# Patient Record
Sex: FEMALE | Race: WHITE | NOT HISPANIC OR LATINO | Employment: UNEMPLOYED | URBAN - METROPOLITAN AREA
[De-identification: names, ages, dates, MRNs, and addresses within clinical notes are randomized per-mention and may not be internally consistent; named-entity substitution may affect disease eponyms.]

---

## 2023-03-31 ENCOUNTER — OFFICE VISIT (OUTPATIENT)
Dept: GASTROENTEROLOGY | Facility: MEDICAL CENTER | Age: 58
End: 2023-03-31

## 2023-03-31 VITALS
SYSTOLIC BLOOD PRESSURE: 144 MMHG | BODY MASS INDEX: 25.92 KG/M2 | DIASTOLIC BLOOD PRESSURE: 85 MMHG | TEMPERATURE: 98.1 F | HEART RATE: 80 BPM | WEIGHT: 151.8 LBS | HEIGHT: 64 IN

## 2023-03-31 DIAGNOSIS — K57.90 DIVERTICULOSIS: ICD-10-CM

## 2023-03-31 DIAGNOSIS — K21.9 GASTROESOPHAGEAL REFLUX DISEASE, UNSPECIFIED WHETHER ESOPHAGITIS PRESENT: ICD-10-CM

## 2023-03-31 DIAGNOSIS — K31.A0 GASTRIC INTESTINAL METAPLASIA: ICD-10-CM

## 2023-03-31 DIAGNOSIS — K59.00 CONSTIPATION, UNSPECIFIED CONSTIPATION TYPE: Primary | ICD-10-CM

## 2023-03-31 PROBLEM — I10 PRIMARY HYPERTENSION: Status: ACTIVE | Noted: 2023-03-31

## 2023-03-31 RX ORDER — PANTOPRAZOLE SODIUM 40 MG/1
TABLET, DELAYED RELEASE ORAL
COMMUNITY
Start: 2023-01-06

## 2023-03-31 RX ORDER — POLYETHYLENE GLYCOL 3350 17 G/17G
17 POWDER, FOR SOLUTION ORAL DAILY
Qty: 255 G | Refills: 0 | Status: SHIPPED | OUTPATIENT
Start: 2023-03-31

## 2023-03-31 RX ORDER — HYDROCHLOROTHIAZIDE 25 MG/1
TABLET ORAL
COMMUNITY
Start: 2023-03-26

## 2023-03-31 RX ORDER — LOSARTAN POTASSIUM 50 MG/1
TABLET ORAL
COMMUNITY
Start: 2023-02-06

## 2023-03-31 NOTE — PATIENT INSTRUCTIONS
High Fiber Diet   AMBULATORY CARE:   A high-fiber diet  includes foods that have a high amount of fiber  Fiber is the part of fruits, vegetables, and grains that is not broken down by your body  Fiber keeps your bowel movements regular  Fiber can also help lower your cholesterol level, control blood sugar in people with diabetes, and relieve constipation  Fiber can also help you control your weight because it helps you feel full faster  Most adults should eat 25 to 35 grams of fiber each day  Talk to your dietitian or healthcare provider about the amount of fiber you need  Good sources of fiber:       Foods with at least 4 grams of fiber per serving:      ? to ½ cup of high-fiber cereal (check the nutrition label on the box)    ½ cup of blackberries or raspberries    4 dried prunes    1 cooked artichoke    ½ cup of cooked legumes, such as lentils, or red, kidney, and asher beans    Foods with 1 to 3 grams of fiber per servin slice of whole-wheat, pumpernickel, or rye bread    ½ cup of cooked brown rice    4 whole-wheat crackers    1 cup of oatmeal    ½ cup of cereal with 1 to 3 grams of fiber per serving (check the nutrition label on the box)    1 small piece of fruit, such as an apple, banana, pear, kiwi, or orange    3 dates    ½ cup of canned apricots, fruit cocktail, peaches, or pears    ½ cup of raw or cooked vegetables, such as carrots, cauliflower, cabbage, spinach, squash, or corn  Ways that you can increase fiber in your diet:   Choose brown or wild rice instead of white rice  Use whole wheat flour in recipes instead of white or all-purpose flour  Add beans and peas to casseroles or soups  Choose fresh fruit and vegetables with peels or skins on instead of juices  Other diet guidelines to follow: Add fiber to your diet slowly  You may have abdominal discomfort, bloating, and gas if you add fiber to your diet too quickly  Drink plenty of liquids as you add fiber to your diet  You may have nausea or develop constipation if you do not drink enough water  Ask how much liquid to drink each day and which liquids are best for you  © Copyright [employer] 2022 Information is for End User's use only and may not be sold, redistributed or otherwise used for commercial purposes  The above information is an  only  It is not intended as medical advice for individual conditions or treatments  Talk to your doctor, nurse or pharmacist before following any medical regimen to see if it is safe and effective for you  Diverticulosis   WHAT YOU NEED TO KNOW:   Diverticulosis is a condition that causes small pockets called diverticula to form in your intestine  These pockets make it difficult for bowel movements to pass through your digestive system  DISCHARGE INSTRUCTIONS:   Return to the emergency department if:   You have severe pain on the left side of your lower abdomen  Your bowel movements are bright or dark red  Call your doctor if:   You have a fever and chills  You feel dizzy or lightheaded  You have nausea, or you are vomiting  You have a change in your bowel movements  You have questions or concerns about your condition or care  Medicines:   Medicines  to soften your bowel movements may be given  You may also need medicines to treat symptoms such as bloating and pain  Take your medicine as directed  Contact your healthcare provider if you think your medicine is not helping or if you have side effects  Tell your provider if you are allergic to any medicine  Keep a list of the medicines, vitamins, and herbs you take  Include the amounts, and when and why you take them  Bring the list or the pill bottles to follow-up visits  Carry your medicine list with you in case of an emergency  Self-care: The goal of treatment is to manage any symptoms you have and prevent other problems such as diverticulitis   Diverticulitis is swelling or infection of the diverticula  Your healthcare provider may recommend any of the following:  Eat a variety of high-fiber foods  High-fiber foods help you have regular bowel movements  High-fiber foods include cooked beans, fruits, vegetables, and some cereals  Most adults need 25 to 35 grams of fiber each day  Your healthcare provider may recommend that you have more  Ask your healthcare provider how much fiber you need  Increase fiber slowly  You may have abdominal discomfort, bloating, and gas if you add fiber to your diet too quickly  You may need to take a fiber supplement if you are not getting enough fiber from food  Drink liquids as directed  You may need to drink 2 to 3 liters (8 to 12 cups) of liquids every day  Ask your healthcare provider how much liquid to drink each day and which liquids are best for you  Apply heat  on your abdomen for 20 to 30 minutes every 2 hours for as many days as directed  Heat helps decrease pain and muscle spasms  Help prevent diverticulitis or other symptoms: The following may help decrease your risk for diverticulitis or symptoms, such as bleeding  Talk to your provider about these or other things you can do to prevent problems that may occur with diverticulosis  Exercise regularly  Ask your healthcare provider about the best exercise plan for you  Exercise can help you have regular bowel movements  Get 30 minutes of exercise on most days of the week  Maintain a healthy weight  Ask your healthcare provider what a healthy weight is for you  Ask him or her to help you create a weight loss plan if you are overweight  Do not smoke  Nicotine and other chemicals in cigarettes increase your risk for diverticulitis  Ask your healthcare provider for information if you currently smoke and need help to quit  E-cigarettes or smokeless tobacco still contain nicotine  Talk to your healthcare provider before you use these products      Ask your healthcare provider if it is safe to take NSAIDs  NSAIDs may increase your risk of diverticulitis  Follow up with your doctor as directed:  Write down your questions so you remember to ask them during your visits  © Copyright [employer] 2022 Information is for End User's use only and may not be sold, redistributed or otherwise used for commercial purposes  The above information is an  only  It is not intended as medical advice for individual conditions or treatments  Talk to your doctor, nurse or pharmacist before following any medical regimen to see if it is safe and effective for you

## 2023-03-31 NOTE — PROGRESS NOTES
[facility] - Outpatient Consultation  Tasha Estrella 63 y o  female MRN: [id number]  Encounter: [id number]          ASSESSMENT AND PLAN:  76-year-old female with history of hypertension, GERD who presents for evaluation  1  Constipation, unspecified constipation type  2  Diverticulosis  She has history of chronic constipation, bloating and generalized abdominal cramping  She underwent a CT scan in March of this year showing diverticulosis without diverticulitis  I discussed the etiology and natural history of diverticular disease with her today  We discussed bowel regimen including high-fiber diet, fiber supplementation with goal of 25 g/day and adequate hydration  I recommend starting MiraLAX once daily and increasing or decreasing as needed to have a soft, formed bowel movement per day  If she has no improvement on the MiraLAX consider transition to Linzess or Amitiza    - polyethylene glycol (GLYCOLAX) 17 GM/SCOOP powder; Take 17 g by mouth daily  Dispense: 255 g; Refill: 0        3  Gastric intestinal metaplasia  4  Gastroesophageal reflux disease, unspecified whether esophagitis present  She was found to have focal gastric intestinal metaplasia on August 2022 EGD performed at an outside hospital   We discussed that this can sometimes be a precancerous change to the lining of the stomach  I recommend repeating EGD 1 year from her last endoscopy to obtain biopsies     - EGD; Future      Follow-up in 3 months  ______________________________________________________________________    HPI: 76-year-old female with history of hypertension, GERD who presents for evaluation  She reports chronic history of infrequent bowel movements for some time  She usually has a bowel movement twice weekly  She will sometimes use a over-the-counter laxative for relief  She reports not drinking sufficient water and does not eat high-fiber diet    She has tried Metamucil in the past but stopped the fiber supplement because it was not working  She reports spasming throughout her abdomen, bloating which will be relieved after a bowel movement  She has a history of chest discomfort and reflux for which she is taking daily PPI with good relief of her symptoms  She denies rectal bleeding  She does have a sense of incomplete evacuation at times  She has no family history of colon cancer  She takes no antiplatelet or anticoagulant medications  She has no prior GI surgical history      March 2023 CT scan showed diverticulosis without diverticulitis    November 2022 esophagram showed minimal GERD  2022 colonoscopy showed 2 cecal tubular adenomas and gastric biopsy showed focal intestinal metaplasia  REVIEW OF SYSTEMS:    CONSTITUTIONAL: Denies any fever, chills, rigors, and weight loss  HEENT: No earache or tinnitus  Denies hearing loss or visual disturbances  CARDIOVASCULAR: No chest pain or palpitations  RESPIRATORY: Denies any cough, hemoptysis, shortness of breath or dyspnea on exertion  GASTROINTESTINAL: As noted in the History of Present Illness  GENITOURINARY: No problems with urination  Denies any hematuria or dysuria  NEUROLOGIC: No dizziness or vertigo, denies headaches  MUSCULOSKELETAL: Denies any muscle or joint pain  SKIN: Denies skin rashes or itching  ENDOCRINE: Denies excessive thirst  Denies intolerance to heat or cold  PSYCHOSOCIAL: Denies depression or anxiety  Denies any recent memory loss  Historical Information   Past Medical History:   Diagnosis Date   • Diverticulitis of colon      Past Surgical History:   Procedure Laterality Date   • COLONOSCOPY       Social History   Social History     Substance and Sexual Activity   Alcohol Use Yes    Comment: not very often     Social History     Substance and Sexual Activity   Drug Use Never     Social History     Tobacco Use   Smoking Status Never   Smokeless Tobacco Never     History reviewed   No pertinent "family history  Meds/Allergies       Current Outpatient Medications:   •  hydrochlorothiazide (HYDRODIURIL) 25 mg tablet  •  losartan (COZAAR) 50 mg tablet  •  pantoprazole (PROTONIX) 40 mg tablet    No Known Allergies        Objective     Blood pressure 144/85, pulse 80, temperature 98 1 °F (36 7 °C), temperature source Tympanic, height 5' 4\" (1 626 m), weight 68 9 kg (151 lb 12 8 oz)  Body mass index is 26 06 kg/m²  PHYSICAL EXAM:      General Appearance:   Alert, cooperative, no distress   HEENT:   Normocephalic, atraumatic, anicteric  Neck:  Supple, symmetrical, trachea midline   Lungs:   Clear to auscultation bilaterally; no rales, rhonchi or wheezing; respirations unlabored    Heart[de-identified]   Regular rate and rhythm; no murmur, rub, or gallop  Abdomen:   Soft, mild left-sided and epigastric tenderness to deep palpation without rebound or guarding non-distended; normal bowel sounds; no masses, no organomegaly    Genitalia:   Deferred    Rectal:   Deferred    Extremities:  No cyanosis, clubbing or edema    Pulses:  2+ and symmetric    Skin:  No jaundice, rashes, or lesions    Lymph nodes:  No palpable cervical lymphadenopathy        Lab Results:   No visits with results within 1 Day(s) from this visit  Latest known visit with results is:   No results found for any previous visit  Radiology Results:   No results found    "

## 2024-01-10 ENCOUNTER — TELEPHONE (OUTPATIENT)
Age: 59
End: 2024-01-10

## 2024-01-10 NOTE — TELEPHONE ENCOUNTER
Scheduled date of EGD(as of today): 2/7/2024    Physician performing EGD: Dr Moody    Location of EGD: [place]

## 2024-02-14 ENCOUNTER — ANESTHESIA EVENT (OUTPATIENT)
Dept: ANESTHESIOLOGY | Facility: HOSPITAL | Age: 59
End: 2024-02-14

## 2024-02-14 ENCOUNTER — ANESTHESIA (OUTPATIENT)
Dept: ANESTHESIOLOGY | Facility: HOSPITAL | Age: 59
End: 2024-02-14

## 2024-02-22 ENCOUNTER — TELEPHONE (OUTPATIENT)
Dept: GASTROENTEROLOGY | Facility: MEDICAL CENTER | Age: 59
End: 2024-02-22

## 2024-02-22 NOTE — TELEPHONE ENCOUNTER
Did you remind:    You will receive a call a day prior to let you know when to arrive.  Yes    Do you have a copy of your instructions? Yes    Did you remind them of special diets if applicable? NA    Did you remind patient to start clear liquid diet if applicable? Yes    Did you remind patient to hold:  NA      Do you have any other questions or concerns? No/lvm

## 2024-02-26 RX ORDER — SODIUM CHLORIDE 9 MG/ML
125 INJECTION, SOLUTION INTRAVENOUS CONTINUOUS
OUTPATIENT
Start: 2024-02-26

## 2024-03-22 ENCOUNTER — NURSE TRIAGE (OUTPATIENT)
Age: 59
End: 2024-03-22

## 2024-03-22 ENCOUNTER — TELEPHONE (OUTPATIENT)
Age: 59
End: 2024-03-22

## 2024-03-22 NOTE — TELEPHONE ENCOUNTER
"Pt called to schedule EGD. Pt transferred.  Answer Assessment - Initial Assessment Questions  1. REASON FOR CALL or QUESTION: \"What is your reason for calling today?\" or \"How can I best help you?\" or \"What question do you have that I can help answer?\"      Pt called to schedule EGD.    Protocols used: Information Only Call - No Triage-ADULT-[place]    "

## 2024-03-29 ENCOUNTER — TELEPHONE (OUTPATIENT)
Dept: GASTROENTEROLOGY | Facility: MEDICAL CENTER | Age: 59
End: 2024-03-29

## 2024-03-29 NOTE — TELEPHONE ENCOUNTER
MAEM to reschedule her procedure and cancel her un needed consult . She already saw Dr. Jaiyeola just needs her EGD

## 2024-05-28 ENCOUNTER — TELEPHONE (OUTPATIENT)
Dept: GASTROENTEROLOGY | Facility: MEDICAL CENTER | Age: 59
End: 2024-05-28

## 2024-05-28 ENCOUNTER — OFFICE VISIT (OUTPATIENT)
Dept: GASTROENTEROLOGY | Facility: MEDICAL CENTER | Age: 59
End: 2024-05-28
Payer: COMMERCIAL

## 2024-05-28 VITALS
DIASTOLIC BLOOD PRESSURE: 82 MMHG | BODY MASS INDEX: 26.19 KG/M2 | WEIGHT: 153.4 LBS | SYSTOLIC BLOOD PRESSURE: 124 MMHG | TEMPERATURE: 98.1 F | HEART RATE: 72 BPM | HEIGHT: 64 IN

## 2024-05-28 DIAGNOSIS — K31.A0 GASTRIC INTESTINAL METAPLASIA: ICD-10-CM

## 2024-05-28 DIAGNOSIS — K21.9 GASTROESOPHAGEAL REFLUX DISEASE, UNSPECIFIED WHETHER ESOPHAGITIS PRESENT: Primary | ICD-10-CM

## 2024-05-28 DIAGNOSIS — K59.00 CONSTIPATION, UNSPECIFIED CONSTIPATION TYPE: ICD-10-CM

## 2024-05-28 PROCEDURE — 99214 OFFICE O/P EST MOD 30 MIN: CPT | Performed by: INTERNAL MEDICINE

## 2024-05-28 NOTE — TELEPHONE ENCOUNTER
Procedure: EGD   Date: 08/15/2024  Physician performing: Dr. JAIYEOLA   Location of procedure:  [place]  Instructions given to patient: EGD PREP   Diabetic: N/A  Clearances: YES     Patient needs Cardiac Clearance

## 2024-05-28 NOTE — PROGRESS NOTES
[facility] - Outpatient Follow-up Note  Manoj Ruth 59 y.o. female MRN: [id number]  Encounter: [id number]          ASSESSMENT AND PLAN:   59-year-old female with history of hypertension who presents for follow-up evaluation.    1. Gastroesophageal reflux disease, unspecified whether esophagitis present  2. Gastric intestinal metaplasia  She underwent an EGD in 2022 for history of GERD and for rule out of GI involvement of amyloidosis and gastric biopsy showed focal intestinal metaplasia.  She is due for surveillance EGD to obtain mapping biopsies.  Continue Protonix daily in addition to dietary/lifestyle antireflux measures.    I obtained informed consent from the patient. The risks/benefits/alternatives of the procedure were discussed with the patient. Risks included, but not limited to, infection, bleeding, perforation, injury to organs in the abdomen, missed lesion and incomplete procedure were discussed. Patient was agreeable and electronic signature was obtained.      3. Constipation, unspecified constipation type  She has a of chronic constipation and symptoms are currently controlled with dietary and lifestyle changes. had gassiness with the medication I discussed if she has recurrent constipation can consider initiation of Linzess or Amitiza.     She previously trialed MiraLAX but reported gassiness with the medication.  We discussed if she has recurrent constipation can consider initiation of Linzess or Amitiza    ______________________________________________________________________    SUBJECTIVE: 59-year-old female with history of hypertension who presents for follow-up evaluation.    She was last seen in the GI office March 2023.  At that time she had a history of chronic constipation, bloating and generalized abdominal discomfort.  She underwent a CT scan earlier that year showing diverticulosis without diverticulitis.  At that office visit constipation management was  discussed and she was recommended to start MiraLAX once daily.  She had also undergone EGD in August 2022 at an outside hospital showing focal gastric intestinal metaplasia and was recommended to repeat EGD in 1 year for mapping biopsies.    She is taking Protonix once daily with good control of her reflux symptoms.  She was previously having constipation started MiraLAX but reported gassiness with the medication.  She started core and pelvic training exercises and has been having regular, formed bowel movements with this therapy.    She states that she follows with a cardiologist for history of a genetic mutation that could be related to amyloidosis.  She has not been given a diagnosis of any significant cardiac disease but reports her mother also has the same genetic abnormality.  She initially underwent her EGD in 2022 for rule out of GI involvement of amyloidosis which was negative.        2024 liver enzymes are within normal limits  2022 abdominal ultrasound showed small hepatic lesions and subsequent MRI of the abdomen confirmed small hemangiomas.      Prior EGD/colonoscopy     November 2022 esophagram showed minimal GERD  2022 colonoscopy showed 2 cecal tubular adenomas and EGD showed gastritis gastric biopsy showed focal intestinal metaplasia.    REVIEW OF SYSTEMS IS OTHERWISE NEGATIVE.  10 point review of systems negative other than stated as per HPI    Historical Information   Past Medical History:   Diagnosis Date    Diverticulitis of colon     Hereditary amyloidosis (HCC)      Past Surgical History:   Procedure Laterality Date    COLONOSCOPY       Social History   Social History     Substance and Sexual Activity   Alcohol Use Yes    Comment: not very often     Social History     Substance and Sexual Activity   Drug Use Never     Social History     Tobacco Use   Smoking Status Never   Smokeless Tobacco Never     History reviewed. No pertinent family history.    Meds/Allergies       Current Outpatient  "Medications:     hydrochlorothiazide (HYDRODIURIL) 25 mg tablet    losartan (COZAAR) 50 mg tablet    Multiple Vitamins-Minerals (CENTRUM SILVER ADULT 50+ PO)    pantoprazole (PROTONIX) 40 mg tablet    polyethylene glycol (GLYCOLAX) 17 GM/SCOOP powder    No Known Allergies        Objective     Blood pressure 124/82, pulse 72, temperature 98.1 °F (36.7 °C), temperature source Tympanic, height 5' 4\" (1.626 m), weight 69.6 kg (153 lb 6.4 oz). Body mass index is 26.33 kg/m².      PHYSICAL EXAM:      General Appearance:   Alert, cooperative, no distress   HEENT:   Normocephalic, atraumatic, anicteric.     Neck:  Supple, symmetrical, trachea midline   Lungs:   Clear to auscultation bilaterally; no rales, rhonchi or wheezing; respirations unlabored    Heart::   Regular rate and rhythm; no murmur, rub, or gallop.   Abdomen:   Soft, non-tender, non-distended; normal bowel sounds; no masses, no organomegaly    Genitalia:   Deferred    Rectal:   Deferred    Extremities:  No cyanosis, clubbing or edema    Pulses:  2+ and symmetric    Skin:  No jaundice, rashes, or lesions    Lymph nodes:  No palpable cervical lymphadenopathy        Lab Results:   No visits with results within 1 Day(s) from this visit.   Latest known visit with results is:   No results found for any previous visit.         Radiology Results:   US TRANSVAGINAL (OB/GYN MFM PERFORMED)    Result Date: 2024  Narrative: DIAGNOSTIC ULTRASOUND OF PELVIS, GYNECOLOGIC LV [facility]-[address] [place] [address] Voice:  [phone] Fax:  [phone] MAY 14 2024 RE: Manoj Ruth             Physician: JV Cosme MR#: [id number]                      [address] : 1965 SS#(Exam #: [id number])              [place] [address] Fax: [phone] The LMP of this 59 year old patient was unknown. A sonographic examination was performed on MAY 14 2024 using real time " "equipment. The ultrasound examination was performed using abdominal & vaginal techniques. The indication for this exam was pelvic pain. INDICATIONS pelvic pain   CERVICAL EVALUATION The cervix appeared normal (Ultrasound Examination). UTERUS The uterus was visualized, retroflexed & anteverted in orientation with a symmetrical shape. The size appeared normal, measuring 7.54 x 3.95 x 3.82 cm. The myometrium appeared heterogeneous. The endometrium was symmetrical in shape with a double layer thickness of 0.32 cm. The endometrium lining appeared regular. FIBROIDS Subserosal myometrium fibroid located in the anterior corpus region, measuring 1.8 x 2.3 x 2.4cm with a volume of 5.2cc. CUL DE SAC FLUID No free fluid was identified in the cul de sac. ADNEXA The left ovary appeared normal and measured 1.5 x 0.9 x 1.7 cm with a volume of 1.2 cc. The right ovary appeared normal and measured 1.9 x 1.7 x 1.4 cm with a volume of 2.3 cc. GENERAL COMMENT Dear Colleague, Thank you for allowing me to see your patient, Manoj Ruth \"Charisma\" at the [facility]. As you know she is a 59 y.o.  whom you saw on 3/18/24 for a regular annual exam at which time she was complaining of pelvic pain, particularly around the left aspect of her  incision that migrates & radiates to back, sometimes affects the RUQ. Since seeing you she has started to exercise her core muscles and feels this has helped to relax her muscles and therefore improving her pain. Indication(s): pelvic pain Post-menopausal: Yes Hormonal replacement therapy:  No Pertinent past medical/surgical/reproductive history:  x1, tubal The pelvis was evaluated by transabdominal and transvaginal imaging. Where available, images were compared to prior studies. Comprehensive imaging of the pelvis was performed utilizing  2D/3D imaging with multiple sagittal/axial/coronal planes interrogating all pelvic structures. Anatomic " targets/views included the uterus (size/contour, myometrial characteristics,  junctional zone, endometrial echo, uterine cavity), cervix, Pouch of Jesse (cul de sac), bilateral adnexal regions and bladder.  When appropriate, uterine and ovarian sliding signs were performed to assess for pelvic adhesive disease and the anterior/posterior compartments were assessed for the presence of superficial or deep infiltrating endometriosis. When appropriate, care was taken to assess/document areas of tenderness to transducer pressure. Pertinent findings noted: Uterus: 7.5 x 3.8 x 4 cm in size with a symmetric contour. Anteverted, retroflexed. Myometrium: An anterior FIGO grade 6 fibroid is seen measuring 1.8 x 2.4 x 2.3 cm transvaginally, but not as appreciated transabdominally. The anterior myometrium of the lower uterine segment appears subjectively thin. Endometrial thickness: 3.2 mm. Endometrium: No evidence of endometrial polyp, submucous myoma or abnormal vascularity. Cervix: Normal, with no evidence of endocervical polyp, abnormal vascularity or discrete mass Right ovary: Normal Left ovary: Normal Pouch of Jesse: No free fluid. Uterosacral ligaments: Normal bilaterally. Posterior compartment: Unremarkable. Anterior compartment: Unremarkable. Kidneys/Ureters: No evidence of hydronephrosis or hydroureter Other: Both the right and left rectus muscles were evaluated using a high frequency transabdominal transducer and no significant abnormalities were noted. There was no evidence of an abdominal wall endometrioma. Pain assessment: Pain elicited during transabdominal imaging of the left rectus muscle, but no other areas of tenderness elicited during the transvaginal exam. Mobility assessment: Negative/abnormal anterior & posterior cul de sac sliding signs. Transabdominally, the anterior fundus of the uterus appears to be tethered/adhesed to the anterior abdominal wall. Positive/normal sliding sign of the bilateral  ovaries. Summary of sonographic findings: Subserosal fibroid vs muscular adhesion of the uterus to the anterior abdominal wall Uterine adhesions to the anterior abdominal wall No evidence of endometriosis or adenomyosis. Unremarkable appearing rectus muscles Comments: The anterior subserosal fibroid wasn't appreciated as much transabdominally, but rather in the same location of that fibroid the uterus on transabdominal imaging appeared to be adhesed to the anterior abdominal wall. So the differential diagnosis for the anterior myometrial mass should include both fibroid and muscular adhesion. Recommendations: Clinical followup as appropriate Today she reported that her pain seems improved with core exercises & stretches, therefore I discussed with the patient that I believe the most likely cause of her pain is musculoskeletal. She was encouraged to continue with exercises. If pain worsens, please consider referring her to physical therapy. I would like to thank you for allowing me to participate in Charisma's medical care. Should you have any further questions regarding my recommendations, please don't hesitate to call. Sincerely, Annita Berry MD, FACOG I certify that I have personally reviewed this study and agree with the report. Annita Berry M.D. Electronically Signed 05/14/24 13:06      This note was completed in part utilizing Dragon Software. Grammatical errors, random word insertions, spelling mistakes, and incomplete sentences may be an occasional consequence of this system secondary to software limitations, ambient noise, and hardware issues. If you have any questions or concerns about the content, text, or information contained within the body of this dictation, please contact the provider for clarification.

## 2024-07-23 ENCOUNTER — TELEPHONE (OUTPATIENT)
Dept: GASTROENTEROLOGY | Facility: MEDICAL CENTER | Age: 59
End: 2024-07-23

## 2024-07-29 ENCOUNTER — TELEPHONE (OUTPATIENT)
Dept: GASTROENTEROLOGY | Facility: MEDICAL CENTER | Age: 59
End: 2024-07-29

## 2024-07-29 NOTE — TELEPHONE ENCOUNTER
Faxed [facility] _ [phone] Cardiac clearance for patient for procedure on 08/15/2024 Second attempt

## 2024-07-31 ENCOUNTER — ANESTHESIA EVENT (OUTPATIENT)
Dept: ANESTHESIOLOGY | Facility: HOSPITAL | Age: 59
End: 2024-07-31

## 2024-07-31 ENCOUNTER — ANESTHESIA (OUTPATIENT)
Dept: ANESTHESIOLOGY | Facility: HOSPITAL | Age: 59
End: 2024-07-31

## 2024-08-02 ENCOUNTER — TELEPHONE (OUTPATIENT)
Dept: GASTROENTEROLOGY | Facility: MEDICAL CENTER | Age: 59
End: 2024-08-02

## 2024-08-02 NOTE — TELEPHONE ENCOUNTER
Left voicemail and requested call back     Called patient to confirm procedure on 08/15/2024 with Dr. Jaiyeola, please give us a call to confirm. I will send a confirmation message via ipvive

## 2024-08-02 NOTE — TELEPHONE ENCOUNTER
[facility], nurse will call patient to schedule appointment with patient to see cardiologist to get the clearance before 08/15

## 2024-08-15 ENCOUNTER — HOSPITAL ENCOUNTER (OUTPATIENT)
Dept: GASTROENTEROLOGY | Facility: MEDICAL CENTER | Age: 59
Setting detail: OUTPATIENT SURGERY
Discharge: HOME | End: 2024-08-15
Payer: COMMERCIAL

## 2024-08-15 ENCOUNTER — ANESTHESIA (OUTPATIENT)
Dept: GASTROENTEROLOGY | Facility: MEDICAL CENTER | Age: 59
End: 2024-08-15

## 2024-08-15 ENCOUNTER — ANESTHESIA EVENT (OUTPATIENT)
Dept: GASTROENTEROLOGY | Facility: MEDICAL CENTER | Age: 59
End: 2024-08-15

## 2024-08-15 VITALS
HEIGHT: 64 IN | RESPIRATION RATE: 18 BRPM | TEMPERATURE: 97 F | HEART RATE: 67 BPM | BODY MASS INDEX: 26.12 KG/M2 | DIASTOLIC BLOOD PRESSURE: 75 MMHG | WEIGHT: 153 LBS | OXYGEN SATURATION: 100 % | SYSTOLIC BLOOD PRESSURE: 125 MMHG

## 2024-08-15 DIAGNOSIS — K21.9 GASTROESOPHAGEAL REFLUX DISEASE, UNSPECIFIED WHETHER ESOPHAGITIS PRESENT: ICD-10-CM

## 2024-08-15 PROCEDURE — 88313 SPECIAL STAINS GROUP 2: CPT | Performed by: PATHOLOGY

## 2024-08-15 PROCEDURE — 43251 EGD REMOVE LESION SNARE: CPT | Performed by: INTERNAL MEDICINE

## 2024-08-15 PROCEDURE — 88305 TISSUE EXAM BY PATHOLOGIST: CPT | Performed by: PATHOLOGY

## 2024-08-15 PROCEDURE — 43239 EGD BIOPSY SINGLE/MULTIPLE: CPT | Performed by: INTERNAL MEDICINE

## 2024-08-15 RX ORDER — SODIUM CHLORIDE 9 MG/ML
INJECTION, SOLUTION INTRAVENOUS CONTINUOUS PRN
Status: DISCONTINUED | OUTPATIENT
Start: 2024-08-15 | End: 2024-08-15

## 2024-08-15 RX ORDER — PROPOFOL 10 MG/ML
INJECTION, EMULSION INTRAVENOUS AS NEEDED
Status: DISCONTINUED | OUTPATIENT
Start: 2024-08-15 | End: 2024-08-15

## 2024-08-15 RX ORDER — SODIUM CHLORIDE 9 MG/ML
125 INJECTION, SOLUTION INTRAVENOUS CONTINUOUS
Status: DISCONTINUED | OUTPATIENT
Start: 2024-08-15 | End: 2024-08-19 | Stop reason: HOSPADM

## 2024-08-15 RX ORDER — ONDANSETRON 2 MG/ML
4 INJECTION INTRAMUSCULAR; INTRAVENOUS ONCE AS NEEDED
Status: DISCONTINUED | OUTPATIENT
Start: 2024-08-15 | End: 2024-08-19 | Stop reason: HOSPADM

## 2024-08-15 RX ORDER — LIDOCAINE HYDROCHLORIDE 20 MG/ML
INJECTION, SOLUTION EPIDURAL; INFILTRATION; INTRACAUDAL; PERINEURAL AS NEEDED
Status: DISCONTINUED | OUTPATIENT
Start: 2024-08-15 | End: 2024-08-15

## 2024-08-15 RX ADMIN — PROPOFOL 150 MG: 10 INJECTION, EMULSION INTRAVENOUS at 09:07

## 2024-08-15 RX ADMIN — SODIUM CHLORIDE: 0.9 INJECTION, SOLUTION INTRAVENOUS at 09:05

## 2024-08-15 RX ADMIN — PROPOFOL 40 MG: 10 INJECTION, EMULSION INTRAVENOUS at 09:12

## 2024-08-15 RX ADMIN — SODIUM CHLORIDE 125 ML/HR: 0.9 INJECTION, SOLUTION INTRAVENOUS at 09:02

## 2024-08-15 RX ADMIN — LIDOCAINE HYDROCHLORIDE 80 MG: 20 INJECTION, SOLUTION EPIDURAL; INFILTRATION; INTRACAUDAL; PERINEURAL at 09:07

## 2024-08-15 RX ADMIN — PROPOFOL 50 MG: 10 INJECTION, EMULSION INTRAVENOUS at 09:10

## 2024-08-15 NOTE — ANESTHESIA PREPROCEDURE EVALUATION
Procedure:  EGD    Relevant Problems   ANESTHESIA (within normal limits)      CARDIO   (+) Primary hypertension   (-) Angina at rest   (-) Angina of effort   (-) Chest pain   (-) FABIAN (dyspnea on exertion)      ENDO   (-) Diabetes mellitus type 1 (HCC)   (-) Diabetes mellitus, type 2 (HCC)      GI/HEPATIC   (-) Chronic liver disease      /RENAL   (-) Chronic kidney disease      NEURO/PSYCH   (-) CVA (cerebral vascular accident) (HCC)   (-) Seizures (HCC)      PULMONARY   (-) Asthma   (-) Chronic obstructive pulmonary disease (HCC)   (-) Sleep apnea        Physical Exam    Airway    Mallampati score: I  TM Distance: >3 FB  Neck ROM: full     Dental   No notable dental hx     Cardiovascular      Pulmonary      Other Findings  post-pubertal.      Anesthesia Plan  ASA Score- 2     Anesthesia Type- IV sedation with anesthesia with ASA Monitors.         Additional Monitors:     Airway Plan:            Plan Factors-Exercise tolerance (METS): >4 METS.    Chart reviewed.                      Induction- intravenous.    Postoperative Plan-         Informed Consent- Anesthetic plan and risks discussed with patient.  I personally reviewed this patient with the CRNA. Discussed and agreed on the Anesthesia Plan with the CRNA..

## 2024-08-15 NOTE — ANESTHESIA POSTPROCEDURE EVALUATION
Post-Op Assessment Note    CV Status:  Stable  Pain Score: 0    Pain management: adequate       Mental Status:  Sleepy   Hydration Status:  Euvolemic   PONV Controlled:  Controlled   Airway Patency:  Patent     Post Op Vitals Reviewed: Yes    No anethesia notable event occurred.    Staff: CRNA               /72 (08/15/24 0919)    Temp      Pulse 82 (08/15/24 0919)   Resp 18 (08/15/24 0919)    SpO2 94 % (08/15/24 0919)

## 2024-08-15 NOTE — H&P
H&P EXAM - Outpatient Endoscopy   Manoj Ruth 59 y.o. female MRN: [id number]    [facility] GI LAB PRE/PST   Encounter: [id number]        History and Physical -  [facility]  Manoj Ruth 59 y.o. female MRN: [id number]                  HPI: Manoj Ruth is a 59 y.o. year old female who presents for gastric intestinal metaplasia      REVIEW OF SYSTEMS: Per the HPI, and otherwise unremarkable.    Historical Information   Past Medical History:   Diagnosis Date    Diverticulitis of colon     Hereditary amyloidosis (HCC)     Hypertension      Past Surgical History:   Procedure Laterality Date     SECTION      COLONOSCOPY       Social History   Social History     Substance and Sexual Activity   Alcohol Use Yes    Comment: not very often     Social History     Substance and Sexual Activity   Drug Use Never     Social History     Tobacco Use   Smoking Status Never   Smokeless Tobacco Never     History reviewed. No pertinent family history.    Meds/Allergies     Not in a hospital admission.    No Known Allergies    Objective     There were no vitals taken for this visit.      PHYSICAL EXAM    Gen: NAD  CV: RRR  CHEST: Clear  ABD: soft, NT/ND  EXT: no edema      ASSESSMENT/PLAN:  This is a 59 y.o. year old female here for egd, and she is stable and optimized for her procedure.

## 2024-08-16 PROCEDURE — 88305 TISSUE EXAM BY PATHOLOGIST: CPT | Performed by: PATHOLOGY

## 2024-08-16 PROCEDURE — 88313 SPECIAL STAINS GROUP 2: CPT | Performed by: PATHOLOGY

## 2024-09-09 ENCOUNTER — OFFICE VISIT (OUTPATIENT)
Dept: FAMILY MEDICINE CLINIC | Facility: CLINIC | Age: 59
End: 2024-09-09
Payer: COMMERCIAL

## 2024-09-09 ENCOUNTER — TELEPHONE (OUTPATIENT)
Dept: ADMINISTRATIVE | Facility: OTHER | Age: 59
End: 2024-09-09

## 2024-09-09 VITALS
HEART RATE: 86 BPM | WEIGHT: 150 LBS | TEMPERATURE: 96.8 F | DIASTOLIC BLOOD PRESSURE: 90 MMHG | BODY MASS INDEX: 25.61 KG/M2 | SYSTOLIC BLOOD PRESSURE: 130 MMHG | OXYGEN SATURATION: 98 % | HEIGHT: 64 IN

## 2024-09-09 DIAGNOSIS — Z00.00 ANNUAL PHYSICAL EXAM: ICD-10-CM

## 2024-09-09 DIAGNOSIS — K21.9 GASTROESOPHAGEAL REFLUX DISEASE WITHOUT ESOPHAGITIS: ICD-10-CM

## 2024-09-09 DIAGNOSIS — Z76.89 ENCOUNTER TO ESTABLISH CARE WITH NEW DOCTOR: Primary | ICD-10-CM

## 2024-09-09 DIAGNOSIS — E85.9 AMYLOIDOSIS, UNSPECIFIED TYPE (HCC): ICD-10-CM

## 2024-09-09 DIAGNOSIS — I10 PRIMARY HYPERTENSION: ICD-10-CM

## 2024-09-09 DIAGNOSIS — D50.0 IRON DEFICIENCY ANEMIA DUE TO CHRONIC BLOOD LOSS: ICD-10-CM

## 2024-09-09 PROBLEM — R73.03 PRE-DIABETES: Status: ACTIVE | Noted: 2021-05-13

## 2024-09-09 PROBLEM — M54.16 LUMBAR RADICULOPATHY: Status: ACTIVE | Noted: 2022-12-09

## 2024-09-09 PROBLEM — N39.46 MIXED INCONTINENCE URGE AND STRESS: Status: ACTIVE | Noted: 2023-02-24

## 2024-09-09 PROBLEM — G56.00 CARPAL TUNNEL SYNDROME: Status: ACTIVE | Noted: 2021-11-22

## 2024-09-09 PROCEDURE — 99386 PREV VISIT NEW AGE 40-64: CPT | Performed by: FAMILY MEDICINE

## 2024-09-09 RX ORDER — BISACODYL 5 MG/1
5 TABLET, DELAYED RELEASE ORAL DAILY PRN
COMMUNITY

## 2024-09-09 NOTE — TELEPHONE ENCOUNTER
----- Message from Christy GREGORY sent at 9/9/2024  8:33 AM EDT -----  Regarding: Care gap request.  09/09/24 8:33 AM    Hello, our patient listed above has had CRC: Colonoscopy // PAP completed/performed. Please assist in updating the patient chart by pulling the Care Everywhere (CE) document Colonoscopy October 2023 PAP 7/30/24.     Thank you,  Christy HEBERT [address]

## 2024-09-09 NOTE — PROGRESS NOTES
Adult Annual Physical  Name: Manoj Ruth      : 1965      MRN: [id number]  Encounter Provider: Lore Caceres DO  Encounter Date: 2024   Encounter department: [facility] [address] [place] [address]    Assessment & Plan   1. Encounter to establish care with new doctor  2. Annual physical exam  -     CBC and differential; Future  -     Comprehensive metabolic panel; Future  -     Lipid panel; Future  3. Primary hypertension  -     CBC and differential; Future  -     Comprehensive metabolic panel; Future  -     Lipid panel; Future  4. Gastroesophageal reflux disease without esophagitis  5. Iron deficiency anemia due to chronic blood loss  6. Amyloidosis, unspecified type (HCC)  -     CBC and differential; Future  -     Comprehensive metabolic panel; Future  -     Lipid panel; Future    Continue with ambulatory BP monitoring, if elevated above goal, can increase Losartan.     Immunizations and preventive care screenings were discussed with patient today. Appropriate education was printed on patient's after visit summary.    Counseling:  Alcohol/drug use: discussed moderation in alcohol intake, the recommendations for healthy alcohol use, and avoidance of illicit drug use.  Dental Health: discussed importance of regular tooth brushing, flossing, and dental visits.  Sexual health: discussed sexually transmitted diseases, partner selection, use of condoms, avoidance of unintended pregnancy, and contraceptive alternatives.  Exercise: the importance of regular exercise/physical activity was discussed. Recommend exercise 3-5 times per week for at least 30 minutes.       Depression Screening and Follow-up Plan: Patient was screened for depression during today's encounter. They screened negative with a PHQ-2 score of 0.    History of Present Illness     Adult Annual Physical:  Patient presents for annual physical. Monitoring BP at home, states that she is usually 118-120s/80s. Had  "stress test last week, following with cardiology for the last 2 years due to having the gene for amyloidosis. Her mother is currently on hospice with amyloidosis.   .     Diet and Physical Activity:  - Diet/Nutrition: well balanced diet.  - Exercise: moderate cardiovascular exercise, 30-60 minutes on average and 5-7 times a week on average.    Depression Screening:  - PHQ-2 Score: 0    General Health:  - Sleep: sleeps well.  - Hearing: normal hearing bilateral ears.  - Vision: wears glasses, goes for regular eye exams and most recent eye exam < 1 year ago.  - Dental: regular dental visits and brushes teeth twice daily.    /GYN Health:  - Follows with GYN: yes.   - Menopause: postmenopausal.   - History of STDs: yes  - Contraception: menopause.      Review of Systems      Objective     /90 (BP Location: Left arm, Patient Position: Sitting, Cuff Size: Standard)   Pulse 86   Temp (!) 96.8 °F (36 °C) (Tympanic)   Ht 5' 4\" (1.626 m)   Wt 68 kg (150 lb)   SpO2 98%   BMI 25.75 kg/m²     Physical Exam  Vitals reviewed.   Constitutional:       General: She is not in acute distress.     Appearance: Normal appearance.   HENT:      Head: Normocephalic and atraumatic.      Right Ear: External ear normal.      Left Ear: External ear normal.      Nose: Nose normal.      Mouth/Throat:      Mouth: Mucous membranes are moist.   Eyes:      Extraocular Movements: Extraocular movements intact.      Conjunctiva/sclera: Conjunctivae normal.      Pupils: Pupils are equal, round, and reactive to light.   Cardiovascular:      Rate and Rhythm: Normal rate and regular rhythm.      Heart sounds: Normal heart sounds.   Pulmonary:      Effort: Pulmonary effort is normal.      Breath sounds: Normal breath sounds.   Abdominal:      General: Bowel sounds are normal. There is no distension.      Palpations: Abdomen is soft.      Tenderness: There is no abdominal tenderness.   Musculoskeletal:      Cervical back: Neck supple.      Right " lower leg: No edema.      Left lower leg: No edema.   Lymphadenopathy:      Cervical: No cervical adenopathy.   Skin:     General: Skin is warm.      Capillary Refill: Capillary refill takes less than 2 seconds.      Findings: No rash.   Neurological:      Mental Status: She is alert. Mental status is at baseline.           DO Farooq Butcher [facility]  9/9/2024 8:57 AM

## 2024-09-10 NOTE — TELEPHONE ENCOUNTER
Upon review of the In Basket request we found no report attached for colonoscopy     Any additional questions or concerns should be emailed to the Practice Liaisons via the appropriate education email address, please do not reply via In Basket.    Thank you  Jessee Maldonado MA   [facility]

## 2024-09-10 NOTE — TELEPHONE ENCOUNTER
Upon review of the In Basket request we were able to locate, review, and update the patient chart as requested for Pap Smear (HPV) aka Cervical Cancer Screening.    Any additional questions or concerns should be emailed to the Practice Liaisons via the appropriate education email address, please do not reply via In Basket.    Thank you  Jessee Maldonado MA   [facility]